# Patient Record
Sex: FEMALE | Race: AMERICAN INDIAN OR ALASKA NATIVE
[De-identification: names, ages, dates, MRNs, and addresses within clinical notes are randomized per-mention and may not be internally consistent; named-entity substitution may affect disease eponyms.]

---

## 2019-03-28 ENCOUNTER — HOSPITAL ENCOUNTER (EMERGENCY)
Dept: HOSPITAL 42 - ED | Age: 69
Discharge: HOME | End: 2019-03-28
Payer: COMMERCIAL

## 2019-03-28 VITALS — BODY MASS INDEX: 41.1 KG/M2

## 2019-03-28 VITALS — RESPIRATION RATE: 18 BRPM

## 2019-03-28 VITALS — DIASTOLIC BLOOD PRESSURE: 69 MMHG | OXYGEN SATURATION: 100 % | HEART RATE: 65 BPM | SYSTOLIC BLOOD PRESSURE: 132 MMHG

## 2019-03-28 VITALS — TEMPERATURE: 97.7 F
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
## 2019-03-28 DIAGNOSIS — M25.511: ICD-10-CM

## 2019-03-28 DIAGNOSIS — S00.11XA: Primary | ICD-10-CM

## 2019-03-28 NOTE — ED PDOC
Physical Exam





Vital Signs











  Temp Pulse Resp BP Pulse Ox


 


 03/28/19 22:14   65  18  132/69  100


 


 03/28/19 20:41   69  18  134/74  98


 


 03/28/19 18:24  97.7 F  74   136/88  97














Medical Decision Making


ED Course and Treatment: 





03/28/19 19:20


Case endorsed to me by Dr. Pulido pending CT Maxillofacial w/o contrast, 

reassessment, and disposition. 





EXAM: CT Maxillofacial without Intravenous Contrast. 


Electronically signed on Mar 28, 2019 9:55:58 PM EDT by:


Jeff Sweet M.D.


IMPRESSION: 


Unremarkable maxillofacial CT.





03/28/19 23:28


On re-evaluation, patient is in no acute distress. I have discussed the results 

and plan with the patient, who expresses understanding. Patient in agreement 

with plan to be discharged home. Patient is stable for discharge. Patient was 

instructed to follow up with physician or return if symptoms worsen or new 

concerning symptoms arise.








- RAD Interpretation


Radiology Orders: 











03/28/19 18:57


MAXILLOFACIAL W/O CONTRAST [CT] Stat 


SHOULDER RIGHT [RAD] Stat 











: Radiologist





- Medication Orders


Current Medication Orders: 














Discontinued Medications





Ibuprofen (Motrin Tab)  600 mg PO STAT STA


   Stop: 03/28/19 18:58


   Last Admin: 03/28/19 19:42  Dose: 600 mg





MAR Pain/Vitals


 Document     03/28/19 19:42  LA  (Rec: 03/28/19 19:44  LA  [id number])


     Pain Reassessment


      Is This A Pain ReAssessment?               No


     Sleep


      Is patient sleeping during reassessment?   No


     Presence of Pain


      Presence of Pain                           Yes


     Pain Scale Used


     Protocol:  PSCALES


      Pain Scale Used                            Numeric


     Location


      Left, Right or Bilateral                   Right


      Pain Location Body Site                    Shoulder


      Intensity                                  7


      Scale Used                                 Numeric














- Scribe Statement


The provider has reviewed the documentation as recorded by the Brayden Byrne





Provider Scribe Attestation:


All medical record entries made by the Brayden were at my direction and 

personally dictated by me. I have reviewed the chart and agree that the record 

accurately reflects my personal performance of the history, physical exam, 

medical decision making, and the department course for this patient. I have also

 personally directed, reviewed, and agree with the discharge instructions and 

disposition.








Disposition/Present on Arrival





- Present on Arrival


Any Indicators Present on Arrival: No


History of DVT/PE: No


History of Uncontrolled Diabetes: No


Urinary Catheter: No


History of Decub. Ulcer: No


History Surgical Site Infection Following: None





- Disposition


Have Diagnosis and Disposition been Completed?: Yes


Diagnosis: 


 Contusion, Shoulder pain





Disposition: HOME/ ROUTINE


Disposition Time: 23:21


Patient Plan: Discharge


Condition: GOOD


Discharge Instructions (ExitCare):  Contusion (DC), Shoulder Pain (DC)


Additional Instructions: 


Follow up with your doctor this week


Prescriptions: 


Ibuprofen [Motrin] 600 mg PO Q6 #20 tab


Forms:  [employer] (English)

## 2019-03-28 NOTE — ED PDOC
Arrival/HPI





- General


Chief Complaint: Trauma


Time Seen by Provider: 03/28/19 18:38


Historian: Patient





- History of Present Illness


Narrative History of Present Illness (Text): 





03/28/19 18:57


69 yo F with pmh of arthritis and COPD presents with cc of right shoulder pain 

s/p fall yesterday morning. Patient reports that speed walking she tripped and 

fell on her right side slightly hitting her head and right knee on the ground. S

he did not lose consciousness. Patient claims she had slight swelling on her 

right eye but she applied ice that reduced the swelling. Patient also complains 

of right knee pain and dyspnea on exertion when rushing, associated with her 

COPD. Patient denies any fevers, chills, dizziness, chest pain, shortness of 

breath, cough, diaphoresis, nausea, vomiting, diarrhea, back pain, neck pain, or

any other complaint.





  


Time/Duration: 24 hours


Symptom Course: Unchanged


Activities at Onset: Light


Context: Home





Past Medical History





- Provider Review


Nursing Documentation Reviewed: Yes





- Infectious Disease


Hx of Infectious Diseases: None





- Psychiatric


Hx Substance Use: No





- Surgical History


Hx Hysterectomy: Yes





Family/Social History





- Physician Review


Nursing Documentation Reviewed: Yes


Family/Social History: Unknown Family HX


Smoking Status: Never Smoked


Hx Alcohol Use: No


Hx Substance Use: No





Allergies/Home Meds


Allergies/Adverse Reactions: 


Allergies





tetanus and diphtheria toxoids Allergy (Verified 03/28/19 18:23)


   ANAPHYLAXIS


nuts Allergy (Uncoded 03/28/19 18:23)


   ANAPHYLAXIS











Review of Systems





- Physician Review


All systems were reviewed & negative as marked: Yes





- Review of Systems


Constitutional: Normal


Eyes: Normal


ENT: Normal


Respiratory: Normal


Cardiovascular: Normal


Gastrointestinal: Normal


Genitourinary Female: Normal


Musculoskeletal: Arthralgias (right shoulder pain)


Skin: Other (mild abraison on both palms of hand)


Neurological: Normal


Endocrine: Normal


Hemo/Lymphatic: Normal


Psychiatric: Normal





Physical Exam


Vital Signs Reviewed: Yes





Vital Signs











  Temp Pulse BP Pulse Ox


 


 03/28/19 18:24  97.7 F  74  136/88  97











Temperature: Afebrile


Blood Pressure: Normal


Pulse: Regular


Appearance: Positive for: Well-Appearing, Non-Toxic, Comfortable


Pain Distress: Mild


Mental Status: Positive for: Alert and Oriented X 3





- Systems Exam


Head: Present: Atraumatic, Normocephalic


Pupils: Present: PERRL


Extroacular Muscles: Present: EOMI


Conjunctiva: Present: Normal


Mouth: Present: Moist Mucous Membranes


Neck: Present: Normal Range of Motion


Respiratory/Chest: Present: Clear to Auscultation, Good Air Exchange.  No: 

Respiratory Distress, Accessory Muscle Use


Cardiovascular: Present: Regular Rate and Rhythm, Normal S1, S2.  No: Murmurs


Abdomen: No: Tenderness, Distention, Peritoneal Signs


Back: Present: Normal Inspection


Upper Extremity: Present: Tenderness (right shoulder).  No: Cyanosis, Edema


Lower Extremity: Present: Normal Inspection.  No: Edema


Neurological: Present: GCS=15, CN II-XII Intact, Speech Normal


Skin: Present: Warm, Dry, Normal Color, Abrasion (mild on both palms of hand, 

ecchymosis on right upper lid).  No: Rashes


Psychiatric: Present: Alert, Oriented x 3, Normal Insight, Normal Concentration





Medical Decision Making


ED Course and Treatment: 





03/28/19 19:10


Impression:


69 yo F with presents with cc of right shoulder pain s/p fall yesterday morning





Differential Diagnosis included but are not limited to:  


-- Right shoulder fracture


-- Maxillofacial fracture





Plan:


-- Ibuprofen


-- Right shoulder x-ray


-- Maxillofacial /o contrast 


-- Reassess and disposition





Prior Visits:


Notes and results from previous visits were reviewed. 





Progress Notes:














- Scribe Statement


The provider has reviewed the documentation as recorded by the Scribe Dalyngs Duvelsaint





All medical record entries made by the Scribe were at my direction and 

personally dictated by me. I have reviewed the chart and agree that the record 

accurately reflects my personal performance of the history, physical exam, 

medical decision making, and the department course for this patient. I have also

personally directed, reviewed, and agree with the discharge instructions and 

disposition.








Disposition/Present on Arrival





- Present on Arrival


Any Indicators Present on Arrival: No


History of DVT/PE: No


History of Uncontrolled Diabetes: No


Urinary Catheter: No


History of Decub. Ulcer: No


History Surgical Site Infection Following: None





- Disposition


Have Diagnosis and Disposition been Completed?: Yes


Diagnosis: 


 Contusion, Shoulder pain





Disposition Time: 21:30


Patient Problems: 


                             Current Active Problems











Problem Status Onset


 


Contusion Acute 


 


Shoulder pain Acute 











Condition: GOOD


Discharge Instructions (ExitCare):  Contusion (DC), Shoulder Pain (DC)


Prescriptions: 


Ibuprofen [Motrin] 600 mg PO Q6 #20 tab


Forms:  [facility] (English)

## 2019-03-29 NOTE — CT
Date of service: 



03/28/2019



PROCEDURE:  CT MAXILLOFACIAL BONES WITHOUT CONTRAST



HISTORY:

r/o fx



COMPARISON:

None available.



TECHNIQUE:

Contiguous axial CT  images of the maxillofacial bones were obtained. 

Coronal and sagittal reformats were generated.



Radiation dose:



Total exam DLP = 758.48 mGy-cm.



This CT exam was performed using one or more of the following dose 

reduction techniques: Automated exposure control, adjustment of the 

mA and/or kV according to patient size, and/or use of iterative 

reconstruction technique.



FINDINGS:



NASAL BONES:

Unremarkable.



ORBITS:

Unremarkable.



PARANASAL SINUSES/ MASTOIDS:

Clear.



MAXILLA:

Unremarkable.



MANDIBLE/ TEMPOROMANDIBULAR JOINTS:

Unremarkable.



SKULL BASE:

Unremarkable.



TEMPORAL BONES:

Middle ears and mastoid grossly unremarkable.



OTHER FINDINGS:

The report concurs with the preliminary [facility] report



IMPRESSION:

Unremarkable non contrast enhanced CT of the maxillofacial bones.

## 2019-03-29 NOTE — RAD
Date of service: 



03/28/2019



PROCEDURE:  Radiographs of the Right Shoulder



HISTORY:

r/o fx







COMPARISON:

No prior.



TECHNIQUE:

3 views obtained.



FINDINGS:



BONES:

Normal. No fracture.



JOINTS:

Normal. Glenohumeral and acromioclavicular joints preserved. No 

osteoarthritis.



SOFT TISSUES:

Normal.



OTHER FINDINGS:

None. 



IMPRESSION:

Normal radiographs of the right shoulder.